# Patient Record
Sex: FEMALE | Race: WHITE | Employment: OTHER | URBAN - METROPOLITAN AREA
[De-identification: names, ages, dates, MRNs, and addresses within clinical notes are randomized per-mention and may not be internally consistent; named-entity substitution may affect disease eponyms.]

---

## 2017-06-08 PROBLEM — Z83.719 FAMILY HISTORY OF COLONIC POLYPS: Status: ACTIVE | Noted: 2017-06-08

## 2017-06-08 PROBLEM — Z83.71 FAMILY HISTORY OF COLONIC POLYPS: Status: ACTIVE | Noted: 2017-06-08

## 2018-08-11 PROCEDURE — 86376 MICROSOMAL ANTIBODY EACH: CPT | Performed by: INTERNAL MEDICINE

## 2018-08-11 PROCEDURE — 86800 THYROGLOBULIN ANTIBODY: CPT | Performed by: INTERNAL MEDICINE

## 2018-08-11 PROCEDURE — 83516 IMMUNOASSAY NONANTIBODY: CPT | Performed by: INTERNAL MEDICINE

## 2018-11-13 PROCEDURE — 86235 NUCLEAR ANTIGEN ANTIBODY: CPT | Performed by: INTERNAL MEDICINE

## 2018-11-13 PROCEDURE — 36415 COLL VENOUS BLD VENIPUNCTURE: CPT | Performed by: INTERNAL MEDICINE

## 2019-03-19 PROBLEM — M16.0 OSTEOARTHRITIS OF BOTH HIPS: Status: ACTIVE | Noted: 2019-03-19

## 2019-03-19 PROBLEM — Z83.719 FAMILY HISTORY OF COLONIC POLYPS: Status: RESOLVED | Noted: 2017-06-08 | Resolved: 2019-03-19

## 2019-03-19 PROBLEM — J38.00 PARALYSIS OF VOCAL CORDS: Status: ACTIVE | Noted: 2019-03-19

## 2019-03-19 PROBLEM — Z13.228 ENCOUNTER FOR SCREENING FOR METABOLIC DISORDER: Status: ACTIVE | Noted: 2019-03-19

## 2019-03-19 PROBLEM — Z13.220 ENCOUNTER FOR SCREENING FOR LIPID DISORDER: Status: ACTIVE | Noted: 2019-03-19

## 2019-03-19 PROBLEM — Z83.71 FAMILY HISTORY OF COLONIC POLYPS: Status: RESOLVED | Noted: 2017-06-08 | Resolved: 2019-03-19

## 2019-03-19 PROBLEM — Z00.00 MEDICARE ANNUAL WELLNESS VISIT, SUBSEQUENT: Status: ACTIVE | Noted: 2019-03-19

## 2020-01-07 PROBLEM — M25.572 ACUTE LEFT ANKLE PAIN: Status: ACTIVE | Noted: 2020-01-07

## 2020-02-17 PROBLEM — I87.2 CHRONIC VENOUS INSUFFICIENCY: Status: ACTIVE | Noted: 2020-02-17

## 2020-02-17 PROBLEM — N30.00 ACUTE CYSTITIS WITHOUT HEMATURIA: Status: ACTIVE | Noted: 2020-02-17

## 2020-05-20 PROBLEM — Z20.822 EXPOSURE TO COVID-19 VIRUS: Status: ACTIVE | Noted: 2020-05-20

## 2020-11-20 PROBLEM — H91.90 DECREASED HEARING, UNSPECIFIED LATERALITY: Status: ACTIVE | Noted: 2020-11-20

## 2021-04-12 DIAGNOSIS — Z23 NEED FOR VACCINATION: ICD-10-CM

## 2022-04-18 NOTE — LETTER
[facility]  [address], [place], [place]    Authorization for Surgical Operation and Procedure                               I hereby authorize Jordy Kasper MD, my physician and his/her assistants (if applicable), which may include medical students, residents, and/or fellows, to perform the following surgical operation/ procedure and administer such anesthesia as may be determined necessary by my physician: Operation/Procedure name (s) Right breast Mastectomy, with lymphoscintigraphy sentinel lymph node biopsy on Bessy DOMINGA Melchor   2.   I recognize that during the surgical operation/procedure, unforeseen conditions may necessitate additional or different procedures than those listed above.  I, therefore, further authorize and request that the above-named surgeon, assistants, or designees perform such procedures as are, in their judgment, necessary and desirable.    3.   My surgeon/physician has discussed prior to my surgery the potential benefits, risks and side effects of this procedure; the likelihood of achieving goals; and potential problems that might occur during recuperation.  They also discussed reasonable alternatives to the procedure, including risks, benefits, and side effects related to the alternatives and risks related to not receiving this procedure.  I have had all my questions answered and I acknowledge that no guarantee has been made as to the result that may be obtained.    4.   Should the need arise during my operation/procedure, which includes change of level of care prior to discharge, I also consent to the administration of blood and/or blood products.  Further, I understand that despite careful testing and screening of blood or blood products by collecting agencies, I may still be subject to ill effects as a result of receiving a blood transfusion and/or blood products.  The following are some, but not all, of the potential risks that can occur: fever and allergic  reactions, hemolytic reactions, transmission of diseases such as Hepatitis, AIDS and Cytomegalovirus (CMV) and fluid overload.  In the event that I wish to have an autologous transfusion of my own blood, or a directed donor transfusion, I will discuss this with my physician.  Check only if Refusing Blood or Blood Products  I understand refusal of blood or blood products as deemed necessary by my physician may have serious consequences to my condition to include possible death. I hereby assume responsibility for my refusal and release the hospital, its personnel, and my physicians from any responsibility for the consequences of my refusal.    o  Refuse   5.   I authorize the use of any specimen, organs, tissues, body parts or foreign objects that may be removed from my body during the operation/procedure for diagnosis, research or teaching purposes and their subsequent disposal by hospital authorities.  I also authorize the release of specimen test results and/or written reports to my treating physician on the hospital medical staff or other referring or consulting physicians involved in my care, at the discretion of the Pathologist or my treating physician.    6.   I consent to the photographing or videotaping of the operations or procedures to be performed, including appropriate portions of my body for medical, scientific, or educational purposes, provided my identity is not revealed by the pictures or by descriptive texts accompanying them.  If the procedure has been photographed/videotaped, the surgeon will obtain the original picture, image, videotape or CD.  The hospital will not be responsible for storage, release or maintenance of the picture, image, tape or CD.    7.   I consent to the presence of a  or observers in the operating room as deemed necessary by my physician or their designees.    8.   I recognize that in the event my procedure results in extended X-Ray/fluoroscopy time, I may  develop a skin reaction.    9. If I have a Do Not Attempt Resuscitation (DNAR) order in place, that status will be suspended while in the operating room, procedural suite, and during the recovery period unless otherwise explicitly stated by me (or a person authorized to consent on my behalf). The surgeon or my attending physician will determine when the applicable recovery period ends for purposes of reinstating the DNAR order.  10. Patients having a sterilization procedure: I understand that if the procedure is successful the results will be permanent and it will therefore be impossible for me to inseminate, conceive, or bear children.  I also understand that the procedure is intended to result in sterility, although the result has not been guaranteed.   11. I acknowledge that my physician has explained sedation/analgesia administration to me including the risk and benefits I consent to the administration of sedation/analgesia as may be necessary or desirable in the judgment of my physician.    I CERTIFY THAT I HAVE READ AND FULLY UNDERSTAND THE ABOVE CONSENT TO OPERATION and/or OTHER PROCEDURE.     ____________________________________  _________________________________        ______________________________  Signature of Patient    Signature of Responsible Person                Printed Name of Responsible Person                                      ____________________________________  _____________________________                ________________________________  Signature of Witness        Date  Time         Relationship to Patient    STATEMENT OF PHYSICIAN My signature below affirms that prior to the time of the procedure; I have explained to the patient and/or his/her legal representative, the risks and benefits involved in the proposed treatment and any reasonable alternative to the proposed treatment. I have also explained the risks and benefits involved in refusal of the proposed treatment and alternatives to  the proposed treatment and have answered the patient's questions. If I have a significant financial interest in a co-management agreement or a significant financial interest in any product or implant, or other significant relationship used in this procedure/surgery, I have disclosed this and had a discussion with my patient.     _____________________________________________________              _____________________________  (Signature of Physician)                                                                                         (Date)                                   (Time)  Patient Name: Bessy ORR Ruiz      : 1940      Printed: 2025     Medical Record #: [id number]                                      Page 1 of 1   impaired balance/decreased flexibility/impaired sensory feedback/decreased strength

## 2025-04-16 PROCEDURE — 88342 IMHCHEM/IMCYTCHM 1ST ANTB: CPT | Performed by: PATHOLOGY

## 2025-04-18 ENCOUNTER — LAB REQUISITION (OUTPATIENT)
Age: 85
End: 2025-04-18
Payer: MEDICARE

## 2025-04-18 DIAGNOSIS — D48.9 NEOPLASM OF UNCERTAIN BEHAVIOR: ICD-10-CM

## 2025-05-21 RX ORDER — CHOLECALCIFEROL (VITAMIN D3) 50 MCG
2000 TABLET ORAL DAILY
COMMUNITY

## 2025-05-23 ENCOUNTER — HOSPITAL ENCOUNTER (OUTPATIENT)
Dept: MAMMOGRAPHY | Facility: HOSPITAL | Age: 85
End: 2025-05-23
Attending: SURGERY
Payer: MEDICARE

## 2025-05-23 ENCOUNTER — HOSPITAL ENCOUNTER (OUTPATIENT)
Dept: NUCLEAR MEDICINE | Facility: HOSPITAL | Age: 85
Discharge: HOME | End: 2025-05-23
Attending: SURGERY
Payer: MEDICARE

## 2025-05-23 ENCOUNTER — HOSPITAL ENCOUNTER (OUTPATIENT)
Facility: HOSPITAL | Age: 85
Setting detail: HOSPITAL OUTPATIENT SURGERY
Discharge: HOME | End: 2025-05-23
Attending: SURGERY | Admitting: SURGERY
Payer: MEDICARE

## 2025-05-23 ENCOUNTER — ANESTHESIA EVENT (OUTPATIENT)
Dept: SURGERY | Facility: HOSPITAL | Age: 85
End: 2025-05-23
Payer: MEDICARE

## 2025-05-23 ENCOUNTER — ANESTHESIA (OUTPATIENT)
Dept: SURGERY | Facility: HOSPITAL | Age: 85
End: 2025-05-23
Payer: MEDICARE

## 2025-05-23 VITALS
HEART RATE: 63 BPM | SYSTOLIC BLOOD PRESSURE: 129 MMHG | BODY MASS INDEX: 33.37 KG/M2 | TEMPERATURE: 98 F | DIASTOLIC BLOOD PRESSURE: 65 MMHG | OXYGEN SATURATION: 98 % | HEIGHT: 62.5 IN | WEIGHT: 186 LBS | RESPIRATION RATE: 18 BRPM

## 2025-05-23 DIAGNOSIS — Z17.0 MALIGNANT NEOPLASM OF OVERLAPPING SITES OF RIGHT BREAST IN FEMALE, ESTROGEN RECEPTOR POSITIVE (HCC): Primary | ICD-10-CM

## 2025-05-23 DIAGNOSIS — C50.811 MALIGNANT NEOPLASM OF OVERLAPPING SITES OF RIGHT BREAST IN FEMALE, ESTROGEN RECEPTOR POSITIVE (HCC): Primary | ICD-10-CM

## 2025-05-23 DIAGNOSIS — C50.919 INVASIVE LOBULAR CARCINOMA OF BREAST IN FEMALE (HCC): ICD-10-CM

## 2025-05-23 PROCEDURE — 88307 TISSUE EXAM BY PATHOLOGIST: CPT | Performed by: SURGERY

## 2025-05-23 PROCEDURE — 88341 IMHCHEM/IMCYTCHM EA ADD ANTB: CPT | Performed by: SURGERY

## 2025-05-23 PROCEDURE — 88342 IMHCHEM/IMCYTCHM 1ST ANTB: CPT | Performed by: SURGERY

## 2025-05-23 PROCEDURE — 88309 TISSUE EXAM BY PATHOLOGIST: CPT | Performed by: SURGERY

## 2025-05-23 PROCEDURE — 78195 LYMPH SYSTEM IMAGING: CPT | Performed by: SURGERY

## 2025-05-23 RX ORDER — ACETAMINOPHEN 500 MG
1000 TABLET ORAL ONCE
Status: COMPLETED | OUTPATIENT
Start: 2025-05-23 | End: 2025-05-23

## 2025-05-23 RX ORDER — HYDROMORPHONE HYDROCHLORIDE 1 MG/ML
0.6 INJECTION, SOLUTION INTRAMUSCULAR; INTRAVENOUS; SUBCUTANEOUS EVERY 5 MIN PRN
Status: DISCONTINUED | OUTPATIENT
Start: 2025-05-23 | End: 2025-05-23

## 2025-05-23 RX ORDER — METOCLOPRAMIDE HYDROCHLORIDE 5 MG/ML
10 INJECTION INTRAMUSCULAR; INTRAVENOUS ONCE
Status: COMPLETED | OUTPATIENT
Start: 2025-05-23 | End: 2025-05-23

## 2025-05-23 RX ORDER — FAMOTIDINE 20 MG/1
20 TABLET, FILM COATED ORAL ONCE
Status: COMPLETED | OUTPATIENT
Start: 2025-05-23 | End: 2025-05-23

## 2025-05-23 RX ORDER — GLYCOPYRROLATE 0.2 MG/ML
INJECTION, SOLUTION INTRAMUSCULAR; INTRAVENOUS AS NEEDED
Status: DISCONTINUED | OUTPATIENT
Start: 2025-05-23 | End: 2025-05-23 | Stop reason: SURG

## 2025-05-23 RX ORDER — METOPROLOL TARTRATE 25 MG/1
25 TABLET, FILM COATED ORAL ONCE AS NEEDED
Status: DISCONTINUED | OUTPATIENT
Start: 2025-05-23 | End: 2025-05-23 | Stop reason: HOSPADM

## 2025-05-23 RX ORDER — MINERAL OIL AND PETROLATUM 150; 830 MG/G; MG/G
OINTMENT OPHTHALMIC AS NEEDED
Status: DISCONTINUED | OUTPATIENT
Start: 2025-05-23 | End: 2025-05-23 | Stop reason: SURG

## 2025-05-23 RX ORDER — SODIUM CHLORIDE, SODIUM LACTATE, POTASSIUM CHLORIDE, CALCIUM CHLORIDE 600; 310; 30; 20 MG/100ML; MG/100ML; MG/100ML; MG/100ML
INJECTION, SOLUTION INTRAVENOUS CONTINUOUS
Status: DISCONTINUED | OUTPATIENT
Start: 2025-05-23 | End: 2025-05-23

## 2025-05-23 RX ORDER — TRAMADOL HYDROCHLORIDE 50 MG/1
50 TABLET ORAL EVERY 6 HOURS PRN
Qty: 20 TABLET | Refills: 0 | Status: SHIPPED | OUTPATIENT
Start: 2025-05-23

## 2025-05-23 RX ORDER — DEXAMETHASONE SODIUM PHOSPHATE 4 MG/ML
VIAL (ML) INJECTION AS NEEDED
Status: DISCONTINUED | OUTPATIENT
Start: 2025-05-23 | End: 2025-05-23 | Stop reason: SURG

## 2025-05-23 RX ORDER — EPHEDRINE SULFATE 50 MG/ML
INJECTION INTRAVENOUS AS NEEDED
Status: DISCONTINUED | OUTPATIENT
Start: 2025-05-23 | End: 2025-05-23 | Stop reason: SURG

## 2025-05-23 RX ORDER — ISOSULFAN BLUE 50 MG/5ML
INJECTION, SOLUTION SUBCUTANEOUS AS NEEDED
Status: DISCONTINUED | OUTPATIENT
Start: 2025-05-23 | End: 2025-05-23 | Stop reason: HOSPADM

## 2025-05-23 RX ORDER — KETOROLAC TROMETHAMINE 30 MG/ML
INJECTION, SOLUTION INTRAMUSCULAR; INTRAVENOUS AS NEEDED
Status: DISCONTINUED | OUTPATIENT
Start: 2025-05-23 | End: 2025-05-23 | Stop reason: SURG

## 2025-05-23 RX ORDER — LIDOCAINE HYDROCHLORIDE 10 MG/ML
INJECTION, SOLUTION EPIDURAL; INFILTRATION; INTRACAUDAL; PERINEURAL AS NEEDED
Status: DISCONTINUED | OUTPATIENT
Start: 2025-05-23 | End: 2025-05-23 | Stop reason: SURG

## 2025-05-23 RX ORDER — MORPHINE SULFATE 4 MG/ML
2 INJECTION, SOLUTION INTRAMUSCULAR; INTRAVENOUS EVERY 10 MIN PRN
Status: DISCONTINUED | OUTPATIENT
Start: 2025-05-23 | End: 2025-05-23

## 2025-05-23 RX ORDER — BUPIVACAINE HYDROCHLORIDE 2.5 MG/ML
INJECTION, SOLUTION EPIDURAL; INFILTRATION; INTRACAUDAL; PERINEURAL AS NEEDED
Status: DISCONTINUED | OUTPATIENT
Start: 2025-05-23 | End: 2025-05-23 | Stop reason: HOSPADM

## 2025-05-23 RX ORDER — MORPHINE SULFATE 4 MG/ML
4 INJECTION, SOLUTION INTRAMUSCULAR; INTRAVENOUS EVERY 10 MIN PRN
Status: DISCONTINUED | OUTPATIENT
Start: 2025-05-23 | End: 2025-05-23

## 2025-05-23 RX ORDER — MORPHINE SULFATE 10 MG/ML
6 INJECTION, SOLUTION INTRAMUSCULAR; INTRAVENOUS EVERY 10 MIN PRN
Status: DISCONTINUED | OUTPATIENT
Start: 2025-05-23 | End: 2025-05-23

## 2025-05-23 RX ORDER — HYDROMORPHONE HYDROCHLORIDE 1 MG/ML
0.2 INJECTION, SOLUTION INTRAMUSCULAR; INTRAVENOUS; SUBCUTANEOUS EVERY 5 MIN PRN
Status: DISCONTINUED | OUTPATIENT
Start: 2025-05-23 | End: 2025-05-23

## 2025-05-23 RX ORDER — ONDANSETRON 2 MG/ML
INJECTION INTRAMUSCULAR; INTRAVENOUS AS NEEDED
Status: DISCONTINUED | OUTPATIENT
Start: 2025-05-23 | End: 2025-05-23 | Stop reason: SURG

## 2025-05-23 RX ORDER — HYDROMORPHONE HYDROCHLORIDE 1 MG/ML
0.4 INJECTION, SOLUTION INTRAMUSCULAR; INTRAVENOUS; SUBCUTANEOUS EVERY 5 MIN PRN
Status: DISCONTINUED | OUTPATIENT
Start: 2025-05-23 | End: 2025-05-23

## 2025-05-23 RX ORDER — NALOXONE HYDROCHLORIDE 0.4 MG/ML
0.08 INJECTION, SOLUTION INTRAMUSCULAR; INTRAVENOUS; SUBCUTANEOUS AS NEEDED
Status: DISCONTINUED | OUTPATIENT
Start: 2025-05-23 | End: 2025-05-23

## 2025-05-23 RX ORDER — FAMOTIDINE 10 MG/ML
20 INJECTION, SOLUTION INTRAVENOUS ONCE
Status: COMPLETED | OUTPATIENT
Start: 2025-05-23 | End: 2025-05-23

## 2025-05-23 RX ORDER — METOCLOPRAMIDE 10 MG/1
10 TABLET ORAL ONCE
Status: COMPLETED | OUTPATIENT
Start: 2025-05-23 | End: 2025-05-23

## 2025-05-23 RX ADMIN — GLYCOPYRROLATE 0.1 MG: 0.2 INJECTION, SOLUTION INTRAMUSCULAR; INTRAVENOUS at 14:46:00

## 2025-05-23 RX ADMIN — LIDOCAINE HYDROCHLORIDE 50 MG: 10 INJECTION, SOLUTION EPIDURAL; INFILTRATION; INTRACAUDAL; PERINEURAL at 14:08:00

## 2025-05-23 RX ADMIN — MINERAL OIL AND PETROLATUM 1 APPLICATION: 150; 830 OINTMENT OPHTHALMIC at 14:11:00

## 2025-05-23 RX ADMIN — EPHEDRINE SULFATE 5 MG: 50 INJECTION INTRAVENOUS at 15:35:00

## 2025-05-23 RX ADMIN — GLYCOPYRROLATE 0.1 MG: 0.2 INJECTION, SOLUTION INTRAMUSCULAR; INTRAVENOUS at 14:38:00

## 2025-05-23 RX ADMIN — SODIUM CHLORIDE, SODIUM LACTATE, POTASSIUM CHLORIDE, CALCIUM CHLORIDE: 600; 310; 30; 20 INJECTION, SOLUTION INTRAVENOUS at 16:24:00

## 2025-05-23 RX ADMIN — DEXAMETHASONE SODIUM PHOSPHATE 4 MG: 4 MG/ML VIAL (ML) INJECTION at 14:11:00

## 2025-05-23 RX ADMIN — KETOROLAC TROMETHAMINE 15 MG: 30 INJECTION, SOLUTION INTRAMUSCULAR; INTRAVENOUS at 15:37:00

## 2025-05-23 RX ADMIN — ONDANSETRON 4 MG: 2 INJECTION INTRAMUSCULAR; INTRAVENOUS at 15:37:00

## 2025-05-23 RX ADMIN — SODIUM CHLORIDE, SODIUM LACTATE, POTASSIUM CHLORIDE, CALCIUM CHLORIDE: 600; 310; 30; 20 INJECTION, SOLUTION INTRAVENOUS at 14:04:00

## 2025-05-23 RX ADMIN — SODIUM CHLORIDE, SODIUM LACTATE, POTASSIUM CHLORIDE, CALCIUM CHLORIDE: 600; 310; 30; 20 INJECTION, SOLUTION INTRAVENOUS at 15:26:00

## 2025-05-23 NOTE — DISCHARGE INSTRUCTIONS
HOME INSTRUCTIONS    Leave Ace wrap in place for 5 days.    Take Tylenol for pain as needed.    Take Tramadol for breakthrough pain    Keep right upper extremity activity below level with the horizon    Sponge bathe only, until the drain is removed.    Dr. Kasper will call with the pathology report next week.    AMBSURG HOME CARE INSTRUCTIONS: POST-OP ANESTHESIA  The medication that you received for sedation or general anesthesia can last up to 24 hours. Your judgment and reflexes may be altered, even if you feel like your normal self.      We Recommend:   Do not drive any motor vehicle or bicycle   Avoid mowing the lawn, playing sports, or working with power tools/applicances (power saws, electric knives or mixers)   That you have someone stay with you on your first night home   Do not drink alcohol or take sleeping pills or tranquilizers   Do not sign legal documents within 24 hours of your procedure   If you had a nerve block for your surgery, take extra care not to put any pressure on your arm or hand for 24 hours    It is normal:  For you to have a sore throat if you had a breathing tube during surgery (while you were asleep!). The sore throat should get better within 48 hours. You can gargle with warm salt water (1/2 tsp in 4 oz warm water) or use a throat lozenge for comfort  To feel muscle aches or soreness especially in the abdomen, chest or neck. The achy feeling should go away in the next 24 hours  To feel weak, sleepy or \"wiped out\". Your should start feeling better in the next 24 hours.   To experience mild discomforts such as sore lip or tongue, headache, cramps, gas pains or a bloated feeling in your abdomen.   To experience mild back pain or soreness for a day or two if you had spinal or epidural anesthesia.   If you had laparoscopic surgery, to feel shoulder pain or discomfort on the day of surgery.   For some patients to have nausea after surgery/anesthesia    If you feel nausea or experience  vomiting:   Try to move around less.   Eat less than usual or drink only liquids until the next morning   Nausea should resolve in about 24 hours    If you have a problem when you are at home:    Call your surgeons office     Discharge Instructions: After Your Surgery  You’ve just had surgery. During surgery, you were given medicine called anesthesia to keep you relaxed and free of pain. After surgery, you may have some pain or nausea. This is common. Here are some tips for feeling better and getting well after surgery.   Going home  Your healthcare provider will show you how to take care of yourself when you go home. They'll also answer your questions. Have an adult family member or friend drive you home. For the first 24 hours after your surgery:   Don't drive or use heavy equipment.  Don't make important decisions or sign legal papers.  Take medicines as directed.  Don't drink alcohol.  Have someone stay with you, if needed. They can watch for problems and help keep you safe.  Be sure to go to all follow-up visits with your healthcare provider. And rest after your surgery for as long as your provider tells you to.   Coping with pain  If you have pain after surgery, pain medicine will help you feel better. Take it as directed, before pain becomes severe. Also, ask your healthcare provider or pharmacist about other ways to control pain. This might be with heat, ice, or relaxation. And follow any other instructions your surgeon or nurse gives you.      Stay on schedule with your medicine.     Tips for taking pain medicine  To get the best relief possible, remember these points:   Pain medicines can upset your stomach. Taking them with a little food may help.  Most pain relievers taken by mouth need at least 20 to 30 minutes to start to work.  Don't wait till your pain becomes severe before you take your medicine. Try to time your medicine so that you can take it before starting an activity. This might be before you  get dressed, go for a walk, or sit down for dinner.  Constipation is a common side effect of some pain medicines. Call your healthcare provider before taking any medicines, such as laxatives or stool softeners, to help ease constipation. Also ask if you should skip any foods. Drinking lots of fluids and eating foods, such as fruits and vegetables, that are high in fiber can also help. Remember, don't take laxatives unless your surgeon has prescribed them.  Drinking alcohol and taking pain medicine can cause dizziness and slow your breathing. It can even be deadly. Don't drink alcohol while taking pain medicine.  Pain medicine can make you react more slowly to things. Don't drive or run machinery while taking pain medicine.  Your healthcare provider may tell you to take acetaminophen to help ease your pain. Ask them how much you're supposed to take each day. Acetaminophen or other pain relievers may interact with your prescription medicines or other over-the-counter (OTC) medicines. Some prescription medicines have acetaminophen and other ingredients in them. Using both prescription and OTC acetaminophen for pain can cause you to accidentally overdose. Read the labels on your OTC medicines with care. This will help you to clearly know the list of ingredients, how much to take, and any warnings. It may also help you not take too much acetaminophen. If you have questions or don't understand the information, ask your pharmacist or healthcare provider to explain it to you before you take the OTC medicine.   Managing nausea  Some people have an upset stomach (nausea) after surgery. This is often because of anesthesia, pain, or pain medicine, less movement of food in the stomach, or the stress of surgery. These tips will help you handle nausea and eat healthy foods as you get better. If you were on a special food plan before surgery, ask your healthcare provider if you should follow it while you get better. Check with your  provider on how your eating should progress. It may depend on the surgery you had. These general tips may help:   Don't push yourself to eat. Your body will tell you when to eat and how much.  Start off with clear liquids and soup. They're easier to digest.  Next try semi-solid foods as you feel ready. These include mashed potatoes, applesauce, and gelatin.  Slowly move to solid foods. Don’t eat fatty, rich, or spicy foods at first.  Don't force yourself to have 3 large meals a day. Instead eat smaller amounts more often.  Take pain medicines with a small amount of solid food, such as crackers or toast. This helps prevent nausea.  When to call your healthcare provider  Call your healthcare provider right away if you have any of these:   You still have too much pain, or the pain gets worse, after taking the medicine. The medicine may not be strong enough. Or there may be a complication from the surgery.  You feel too sleepy, dizzy, or groggy. The medicine may be too strong.  Side effects, such as nausea or vomiting. Your healthcare provider may advise taking other medicines to treat these or may change your treatment plan..  Skin changes, such as rash, itching, or hives. This may mean you have an allergic reaction. Your provider may advise taking other medicines.  The incision looks different (for instance, part of it opens up).  Bleeding or fluid leaking from the incision site, and you weren't told to expect that.  Fever of 100.4°F (38°C) or higher, or as directed by your healthcare provider.  Call 911  Call 911 right away if you have:   Trouble breathing  Facial swelling    If you have obstructive sleep apnea   You were given anesthesia medicine during surgery to keep you comfortable and free of pain. After surgery, you may have more apnea spells because of this medicine and other medicines you were given. The spells may last longer than normal.    At home:  Keep using the continuous positive airway pressure (CPAP)  device when you sleep. Unless your healthcare provider tells you not to, use it when you sleep, day or night. CPAP is a common device used to treat obstructive sleep apnea.  Talk with your provider before taking any pain medicine, muscle relaxants, or sedatives. Your provider will tell you about the possible dangers of taking these medicines.  Contact your provider if your sleeping changes a lot even when taking medicines as directed.  [employer] last reviewed this educational content on 4/1/2024  This information is for informational purposes only. This is not intended to be a substitute for professional medical advice, diagnosis, or treatment. Always seek the advice and follow the directions from your physician or other qualified health care provider.  © 6868-4742 [employer]. All rights reserved. This information is not intended as a substitute for professional medical care. Always follow your healthcare professional's instructions.

## 2025-05-23 NOTE — INTERVAL H&P NOTE
Pre-op Diagnosis: Invasive lobular carcinoma of breast, stage 1, right    The above referenced H&P was reviewed by Jordy Kasper MD on 5/23/2025, the patient was examined and no significant changes have occurred in the patient's condition since the H&P was performed.  I discussed with the patient and/or legal representative the potential benefits, risks and side effects of this procedure; the likelihood of the patient achieving goals; and potential problems that might occur during recuperation.  I discussed reasonable alternatives to the procedure, including risks, benefits and side effects related to the alternatives and risks related to not receiving this procedure.  We will proceed with procedure as planned.    She had the core needle biopsy on the right, demonstrating invasive lobular carcinoma, grade 1, ER positive, KS negative, Ki-67 7%, HER2 negative.  There is lymphovascular invasion.  She saw Dr. Graham in hematology oncology consultation.  We initially discussed mastectomy, and MRI was deferred.  She then reconsidered lumpectomy, and the MRI was completed, which demonstrated just 5.3 cm of suspicious tissue at the index lesion and a separate suspicious lesion in the lower outer quadrant of the breast, as well as permanent right axillary lymph nodes.  At that point, we settled on right total mastectomy with right axillary sentinel lymph node biopsy.  The risks, benefits, and alternatives were reviewed with the patient, her , and her daughter, including bleeding, infection, drain, numbness, postoperative pain, recovery, compression, activity, bathing, pathology, the difference between the sentinel lymph node biopsy procedure and the full axillary dissection she had with her left modified radical mastectomy in 1988, etc.  We are anticipating the probability of discharge today, but leaving open the option of staying the night in the hospital if need be.  The lymphoscintigraphy report and images were  reviewed and discussed with them.  I have also reviewed the MRI report and images.

## 2025-05-23 NOTE — OPERATIVE REPORT
[facility]  (part of [facility])         Patient Name: Bessy Melchor   MRN: [id number]     Date of Operation:  2025   Site:  [facility] (part of [facility])       : 1940       PREOPERATIVE DIAGNOSES: Right breast cancer, T1-3 N0     POSTOPERATIVE DIAGNOSES:  Same     PROCEDURE PERFORMED: Right total mastectomy, right axillary sentinel lymph node biopsy (deep), injection of isosulfan blue dye     SURGEON: Jordy Olivera M.D.      ASSISTANT: GABI Sanchez,  (skilled services required for positioning, prepping, draping, setting up the field, exposing, retracting, suturing, closing, dressing, etc.)     ANESTHESIA: General     COMPLICATIONS: None     ESTIMATED BLOOD LOSS: 50 mL     SPECIMEN: 3 right axillary sentinel lymph nodes, right breast     IMPLANTS: None     GRAFTS: None      DRAINS: 15 Vietnamese round drain     BLOOD PRODUCTS ADMINISTERED: None     HISTORY: This patient is a 84 year old-year-old female who has a history of left breast cancer in , having undergone a left modified radical mastectomy.  She recently had an abnormal right mammogram, and underwent a core needle biopsy, demonstrating invasive lobular carcinoma, grade 1, estrogen receptor positive, with lymphovascular invasion.  Extent-of-disease MRI demonstrated up to 5.3 cm of additional suspicious tissue at the index lesion, a separate suspicious lesion in the breast, and prominent right axillary lymph nodes.  We opted for right total mastectomy with sentinel lymph node biopsy.  The risks, benefits, and alternatives were discussed.      FINDINGS: 3 right axillary sentinel lymph nodes     PROCEDURE: The patient was seen in the preoperative holding area with her  and daughter. The surgical plan was reviewed.  The preoperative imaging was reviewed.  The lymphoscintigraphy images were reviewed and discussed with them.  The right breast was initialed. She was taken to the operating room,  placed in the supine position, and administered general anesthesia.  The upper extremities were positioned on armboards.  The field area was prepped and draped.  A timeout was performed.  5 mL of isosulfan blue dye was injected in the right periareolar area and a 5-minute massage was performed.  A skin and nipple sacrificing right mastectomy incision was marked.  Local anesthetic was infiltrated.  The lateral portion of the incision was opened at the axilla.  Using the aid of the gamma probe probe and visual inspection, dissection was carried through the clavipectoral fascia into the deep right axillary space.  The gamma probe led to a central axillary node which was markedly blue.  Dissected out, it had an ex vivo count of 1394.  It was forwarded to pathology labeled \"right axillary sentinel lymph node #1\".  The gamma probe led to a second deeper node.  Dissecting down to it, a palpable node was identified and removed.  It was not blue.  It had an ex vivo count of 41, and did not represent the hotspot identified on gamma scanning.  It was forwarded to pathology labeled \"right axillary sentinel lymph node #2\".  The gamma probe scanning reidentified the deeper hotspot, and the third lymph node was removed, having an ex vivo count of 1857.  It was not blue.  It was forwarded to pathology labeled \"right axillary sentinel lymph node #3\".  There were no additional hotspots, blue nodes, untraced blue lymphatics, or palpable nodes in the axilla.  The background count was 7.  The remainder of the mastectomy incision was opened with scalpel.  Flaps were raised superiorly, medially, and inferior to the extent of the breast.  The breast was removed en bloc with the pectoral fascia.  The dissection was carried off the lateral pectoralis major and minor muscles and incorporated the entire axillary tail of the breast from the low axilla.  At no point during the dissection was any suspicious tissue identified under the flaps or  along the chest wall.  The specimen was oriented with a marking stitch on the lateral end of the skin ellipse and forwarded to pathology labeled \"right breast\".  The wound was irrigated with water and rendered hemostatic.  The skin was contoured for flat closure.  A 15 Malaysian drain was introduced through the lateral lower flap and positioned through the axilla and under the upper flap.  It was secured with a silk stitch, and subsequently dressed with a Biopatch and Tegaderm.  The skin was reapproximated with INSORB staples followed by a Vicryl subcuticular stitch.  Dermabond, Steri-Strips, fluffs, ABDs, and a 6 inch Ace wrap were applied.  The drain was placed to bulb suction.  The needle, sponge, and instrument counts were reported as correct.  The patient was awakened and transported to recovery.  Her family was notified of the findings and her status.        [employer] Node Biopsy for Breast Cancer - Left  Operation performed with curative intent. Yes   Tracer(s) used to identify sentinel nodes in the upfront surgery (non-neoadjuvant) setting (select all that apply). Dye and Radioactive tracer   Tracer(s) used to identify sentinel nodes in the neoadjuvant setting (select all that apply). N/A   All nodes (colored or non-colored) present at the end of a dye-filled lymphatic channel were removed. Yes   All significantly radioactive nodes were removed. Yes   All palpably suspicious nodes were removed. Yes   Biopsy-proven positive nodes marked with clips prior to chemotherapy were identified and removed. N/A         Jordy Olivera MD

## 2025-05-23 NOTE — ANESTHESIA PREPROCEDURE EVALUATION
Anesthesia PreOp Note    HPI:     Bessy Melchor is a 84 year old female who presents for preoperative consultation requested by: Jordy Kasper MD    Date of Surgery: 5/23/2025    Procedure(s):  Right breast Mastectomy, with lymphoscintigraphy sentinel lymph node biopsy  Breast sentinel lymph node biopsy  Indication: Invasive lobular carcinoma of breast, stage 1, right    Relevant Problems   No relevant active problems       NPO:  Last Liquid Consumption Date: 05/23/25  Last Liquid Consumption Time: 0800 (SMART WATER 1 BOTTLE)  Last Solid Consumption Date: 05/22/25  Last Solid Consumption Time: 2100  Last Liquid Consumption Date: 05/23/25          History Review:  Patient Active Problem List    Diagnosis Date Noted    Decreased hearing, unspecified laterality 11/20/2020    Exposure to COVID-19 virus 05/20/2020    Chronic venous insufficiency 02/17/2020    Acute cystitis without hematuria 02/17/2020    Acute left ankle pain 01/07/2020    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             Vital Signs:  Body mass index is 33.48 kg/m².   height is 1.588 m (5' 2.5\") and weight is 84.4 kg (186 lb). Her oral temperature is 97.5 °F (36.4 °C). Her blood pressure is 142/64 and her pulse is 59. Her respiration is 18 and oxygen saturation is 98%.   Vitals:    05/21/25 0832 05/23/25 1101   BP:  142/64   Pulse:  59   Resp:  18   Temp:  97.5 °F (36.4 °C)   TempSrc:  Oral   SpO2:  98%    Weight: 81.6 kg (180 lb) 84.4 kg (186 lb)   Height: 1.588 m (5' 2.5\")         Anesthesia Evaluation     Patient summary reviewed and Nursing notes reviewed    No history of anesthetic complications   Airway   Mallampati: I  TM distance: >3 FB  Neck ROM: full  Dental      Pulmonary - negative ROS and normal exam   Cardiovascular - normal exam  (+) hypertension    Neuro/Psych - negative ROS     GI/Hepatic/Renal    (+) GERD    Endo/Other    Abdominal                  Anesthesia Plan:   ASA:  3  Plan:   General  Informed Consent Plan and Risks Discussed With:  Patient      I have informed Bessy Sosapaulsusanna and/or legal guardian or family member of the nature of the anesthetic plan, benefits, risks including possible dental damage if relevant, major complications, and any alternative forms of anesthetic management.   All of the patient's questions were answered to the best of my ability. The patient desires the anesthetic management as planned.  Rosa Sanchez MD  5/23/2025 11:54 AM  Present on Admission:  **None**           [1]   Past Medical History:   Breast cancer (HCC)    Cataract    Colon polyps    Degenerative disc disease, cervical    Esophageal reflux    High blood pressure    High cholesterol    Incontinence    Other and unspecified hyperlipidemia    Unspecified essential hypertension    Visual impairment    glasses   [2]   Past Surgical History:  Procedure Laterality Date    Cataract      Cholecystectomy      Hysterectomy      Other surgical history      L. Radical Mastectomy     Other surgical history      biopsy non-cancerous papilloma - R/ breast    [3]   Medications Prior to Admission   Medication Sig Dispense Refill Last Dose/Taking    Cholecalciferol 50 MCG (2000 UT) Oral Tab Take 1 tablet (2,000 Units total) by mouth daily.   5/22/2025 Morning    B Complex Oral Cap Take 1 capsule by mouth.   5/22/2025 Morning    Vitamin A 2400 MCG (8000 UT) Oral Cap Take by mouth.   5/22/2025 Morning    ANALPRAM HC  2.5-1 % Rectal Cream Place 1 Application  rectally as needed.   Taking As Needed    Rosuvastatin Calcium 5 MG Oral Tab   1 5/21/2025    Losartan Potassium (COZAAR) 50 MG Oral Tab Take 1 tablet by mouth once daily. 90 tablet 3 5/22/2025 Morning    atenolol (TENORMIN) 25 MG Oral Tab 1 TABLET DAILY, 0.5 TABLET IN PM (Patient taking differently: Take 1 tablet (25 mg total) by mouth in the morning and 1 tablet (25 mg total) before bedtime. 1 TABLET DAILY, 0.5 TABLET IN PM.) 135 tablet 3 5/23/2025 at  8:00 AM    Multiple Vitamins-Minerals (CENTRUM SILVER OR) Take by mouth.   5/22/2025 Morning    Calcium Citrate-Vitamin D (CITRACAL + D OR) Take by mouth.   Past Week    Probiotic Product (PROBIOTIC OR) Take by mouth.   5/22/2025 Morning    acetaminophen 500 MG Oral Tab Take 1 tablet (500 mg total) by mouth every 6 (six) hours as needed for Pain.   More than a month   [4]   Current Facility-Administered Medications Ordered in Epic   Medication Dose Route Frequency Provider Last Rate Last Admin    lactated ringers infusion   Intravenous Continuous Jordy Kasper MD 20 mL/hr at 05/23/25 1125 New Bag at 05/23/25 1125    metoprolol tartrate (Lopressor) tab 25 mg  25 mg Oral Once PRN Jordy Kasper MD        ceFAZolin (Ancef) 2g in 10mL IV syringe premix  2 g Intravenous Once Jordy Kasper MD         No current [facility]-ordered outpatient medications on file.   [5]   Allergies  Allergen Reactions    Albumin, Egg HIVES    Egg White SWELLING     Swelling of tongue     Penicillins HIVES and RASH    Sulfa Antibiotics HIVES    Epinephrine JITTERY, ANXIETY and RASH     tachycardia    Azithromycin UNKNOWN    Egg UNKNOWN    Adhesive Tape ITCHING    Wound Dressing Adhesive ITCHING   [6]   Family History  Problem Relation Age of Onset    Cancer Father         colon    Other (Other) Father         cva    Cancer Mother         colon    Heart Disorder Mother    [7]   Social History  Socioeconomic History    Marital status:    Tobacco Use     Smoking status: Never    Smokeless tobacco: Never   Vaping Use    Vaping status: Never Used   Substance and Sexual Activity    Alcohol use: No     Comment: rarely    Drug use: No    Sexual activity: Yes     Partners: Male

## 2025-05-23 NOTE — ANESTHESIA PROCEDURE NOTES
Airway  Date/Time: 5/23/2025 2:08 PM  Reason: Elective    Airway not difficult    General Information and Staff   Patient location during procedure: OR  Resident/CRNA: Marisa Johnson CRNA  Performed: CRNA   Performed by: Marisa Johnson CRNA  Authorized by: Rosa Sanchez MD        Indications and Patient Condition  Indications for airway management: anesthesia  Sedation level: deep      Preoxygenated: yesPatient position: sniffing  MILS maintained throughout    Mask difficulty assessment: 1 - vent by mask  Planned trial extubation    Final Airway Details    Final airway type: supraglottic airway      Successful airway: classic  Size: 3     Number of attempts at approach: 1  Number of other approaches attempted: 0    Additional Comments  LMA #3 placed easily- atraumatic

## 2025-05-23 NOTE — ANESTHESIA POSTPROCEDURE EVALUATION
Patient: Bessy Melchor    Procedure Summary       Date: 05/23/25 Room / Location: [facility] MAIN OR 02 / [facility] MAIN OR    Anesthesia Start: 1359 Anesthesia Stop: 1624    Procedures:       Right breast Mastectomy, with lymphoscintigraphy sentinel lymph node biopsy (Right: Breast)      Breast sentinel lymph node biopsy (Right: Breast) Diagnosis: (Invasive lobular carcinoma of breast, stage 1, right)    Surgeons: Jordy Kasper MD Anesthesiologist: Rosa Sanchez MD    Anesthesia Type: general ASA Status: 3            Anesthesia Type: general    Vitals Value Taken Time   /61 05/23/25 16:23   Temp 97.5 °F (36.4 °C) 05/23/25 16:23   Pulse 65 05/23/25 16:24   Resp 16 05/23/25 16:23   SpO2 100 % 05/23/25 16:24   Vitals shown include unfiled device data.    [facility] AN Post Evaluation:   Patient Evaluated in PACU  Patient Participation: complete - patient participated  Level of Consciousness: awake  Pain Score: 0  Pain Management: adequate  Airway Patency:  Dental exam unchanged from preop  Yes    Nausea/Vomiting: none  Cardiovascular Status: acceptable  Respiratory Status: acceptable  Postoperative Hydration acceptable      Marisa Johnson CRNA  5/23/2025 4:24 PM

## 2025-05-23 NOTE — H&P
DIAGNOSIS: Hx of breast cancer (primary encounter diagnosis)  At high risk for breast cancer  Bi-rads category 3 mammogram result    STAGE: Unknown    INTERVAL SINCE DIAGNOSIS: 37 years    She had a left modified radical mastectomy by Dr. Soriano in 1988, at age 48.    DISEASE STATUS: BECKY     HISTORY:   This 84 year old woman returns to follow-up on a BI-RADS 3 recent screening right mammogram. She says she is doing well. She denies new breast symptoms. She denies lumps. She denies nipple discharge. She denies skin changes. She denies lymphadenopathy. Mood, weight, appetite and energy are stable.     She had the recent screening right mammogram, as below. She has several levels of concern about it. She was concerned about the radiologist mentioning she had a \"history of right breast cancer\", which is incorrect. He also mentioned \"the breasts (plural) are \"C\" density\". She is understandably concerned about this documentation being incorrect. We discussed transcription, templating, etc. She has had left breast cancer in the past, and had other experience with biopsies. The right side procedure was a benign intraductal papilloma.    She had a left modified radical mastectomy by Dr. Soriano in 1988. After he retired, she followed with Dr. Francois. After he retired, she follows Dr. Danielle. He retired, now she is seeing me.     She also had a right-sided intraductal papilloma excised in the past.    She is a retired teacher.    She is accompanied by her , Logan.    Her current medication(s) are:   Current Outpatient Medications   Medication Sig Dispense Refill   atenolol 25 MG Oral Tab 1 TABLET po bid 180 tablet 3   Probiotic Product (ALIGN OR) Take by mouth.   B Complex Oral Cap Take 1 capsule by mouth.   Calcium Citrate (CITRACAL OR) Citracal   Vitamin A 2400 MCG (8000 UT) Oral Cap Take by mouth.   acetaminophen 500 MG Oral Tab Take 500 mg by mouth every 6 (six) hours as needed for Pain.   Rosuvastatin Calcium 5  MG Oral Tab TK 1 T PO 3 TIMES A WK 1   Losartan Potassium (COZAAR) 50 MG Oral Tab Take 1 tablet by mouth once daily. 90 tablet 3   Multiple Vitamins-Minerals (CENTRUM SILVER OR) None Entered   Calcium Citrate-Vitamin D (CITRACAL + D OR) 2 tabs 2 times daily     PHYSICAL EXAMINATION:   Arm Circumference: normal. I am not able to discern any lymphedema of her left upper extremity. The skin over the dorsum of her left hand is thin, with good visibility of the veins. She has her wedding band on her left hand, and there does not seem to be any edema around it.    This is a well-nourished, alert and oriented woman. There is palpable cervical, supraclavicular or axillary adenopathy.   The left breast is surgically absent. There are no subflap masses or skin lesions. The chest wall shows no evidence of local recurrence. There is no reconstruction. The right nipple area is everted without discharge. The right breast is large and ptotic. There are no dominant masses, focal nodularity or skin changes on the right side. The abdomen is soft, flat and nontender, without palpable masses or organomegaly.    IMAGING:   EXAM: BI MAMMOGRAM DIAGNOSTIC W TOMOSYNTHESIS RIGHT, BI US BREAST LIMITED RIGHT   ACCESSION: [id number], [id number]     INDICATION: History of left mastectomy. History of right breast cancer.     TECHNIQUE: Low dose full field digital breast tomosynthesis examination was performed with 2D and 3D acquisitions. C-View 2D reconstructions were reviewed. The examination was read in conjunction with computed aided detection(CAD). Targeted ultrasound was performed.     COMPARISON: Previous dating back to 1/9/2017     BREAST COMPOSITION: C - The breasts are heterogeneously dense which may obscure small masses.     FINDINGS:   Posttreatment changes are noted within the upper outer aspect of the right breast at middle depth. There is a new focal asymmetry within the right subareolar region. In addition, there are  prominent appearing right axillary lymph nodes.     Targeted ultrasound of the right breast was performed.     No focal sonographic abnormalities identified in the retroareolar region.     Postsurgical changes are seen in the upper outer right breast.     Scanning of the right axilla demonstrates prominent, probably benign axillary lymph nodes which measure up to 1.2 x 0.8 x 1.4 cm.     IMPRESSION: Probably benign right breast focal asymmetry in the subareolar region and probably benign right axillary lymph nodes. Recommend six-month follow-up right breast mammogram and right axillary ultrasound.     ASSESSMENT: 3: Probably benign.     RECOMMENDATION: 1. US 6 months Axilla: Right   2. Diagnostic Mammo 6 months Breast: Right     COMMENTS:     This examination was reviewed with the aid of a computer-aided detection (CAD) system.     This report uses terminology and categories recommended by the Breast Imaging Reporting and Data System (BI-RADS) of the American College of Radiology.     To comply with federal law, written results are being mailed to this patient.     This patient's information and the target due date for her next mammogram have been entered into a reminder system.     Created by: Victor Hugo Rocha MD   Signed by: Victor Hugo Rocha MD   Signed on: 3/13/2025 11:02 AM   Location: [place] [address]     Unable to review the images, as outside our system.    IMPRESSION: History of left breast cancer, status post left mastectomy 37 years ago, history of excision of right breast intraductal papilloma, now BI-RADS 3 screening right mammogram and ultrasound.    RECOMMENDATIONS: We had a lengthy discussion regarding BI-RADS 3, \"probably benign\". Like many patients, she is troubled by the choice of terminology. I explained this is seemingly an changeable terminology that is derived by the radiology protocols. We discussed that this has already been vetted through the breast radiologists' high requirement for  recommending a biopsy if there is anything concerning. However, as a breast cancer survivor, she is appreciably concerned. I recommended she obtain the images on CD for uploading, and I can review them with the radiologist. I suggested she obtain at least the images from 2024 as well as the current images. We discussed that the recommendation is for 6-month follow-up imaging, but moving forward with an image-guided needle biopsy is likely also an option. She may want to consider this.  With regard to the incorrect information in her mammogram report stating both breasts were imaged, and that she is a right breast cancer survivor, she will call radiology to have a correction placed. I will also reach out to them.

## 2025-05-23 NOTE — PROGRESS NOTES
I had a lengthy telephone conversation with Bessy yesterday.  The MRI demonstrated the biopsy-proven right breast cancer with extension by MRI criteria of 5.3 cm in the AP dimension.  There is a separate area of probable multicentric involvement in the lower outer quadrant by MRI.  There are prominent right axillary lymph nodes for which additional evaluation is recommended.  We agreed to proceed with right total mastectomy with right axillary sentinel lymph node biopsy under the circumstances.  She is not interested in having additional biopsies.  Original plan was for mastectomy, but this was subsequently switched to lumpectomy.  She has had a left modified radical mastectomy without reconstruction in the past.  We discussed the role of sentinel lymph node biopsy, particularly under the circumstances of the size of the index lesion, the lymphovascular invasion seen on the core needle biopsy, and the prominent lymph nodes seen on the MRI.  We had already discussed mastectomy in detail in the office, but reviewed the surgery, drain placement, possible overnight stay in the hospital, pain, nerve injury, seroma, bleeding, infection, etc.  The change in plan was communicated with the surgical team.

## (undated) DEVICE — YANKAUER,FLEXIBLE HANDLE,REGLR CAPACITY: Brand: MEDLINE INDUSTRIES, INC.

## (undated) DEVICE — AEGIS 1" DISK 4MM HOLE, PEEL OPEN: Brand: MEDLINE

## (undated) DEVICE — UNDYED BRAIDED (POLYGLACTIN 910), SYNTHETIC ABSORBABLE SUTURE: Brand: COATED VICRYL

## (undated) DEVICE — Device: Brand: JELCO

## (undated) DEVICE — SPONGE LAP 18X18IN WHT COT 4 PLY FLD STRUNG

## (undated) DEVICE — DRAPE,T,LAPARO,TRANS,STERILE: Brand: MEDLINE

## (undated) DEVICE — 3M™ STERI-STRIP™ REINFORCED ADHESIVE SKIN CLOSURES, R1548, 1 IN X 5 IN (25 MM X 125 MM), 4 STRIPS/ENVELOPE: Brand: 3M™ STERI-STRIP™

## (undated) DEVICE — STAPLER SKIN 30 ABSRB STPL RAP INSORB

## (undated) DEVICE — STANDARD HYPODERMIC NEEDLE,POLYPROPYLENE HUB: Brand: MONOJECT

## (undated) DEVICE — MINOR GENERAL: Brand: MEDLINE INDUSTRIES, INC.

## (undated) DEVICE — BLAKE SILICONE DRAIN, 15 FR ROUND, HUBLESS WITH 3/16" TROCAR: Brand: BLAKE

## (undated) DEVICE — SUT COAT VCRL 2-0 27IN ABSRB UD 26MM CT-2

## (undated) DEVICE — GAMMEX® PI HYBRID SIZE 7.5, STERILE POWDER-FREE SURGICAL GLOVE, POLYISOPRENE AND NEOPRENE BLEND: Brand: GAMMEX

## (undated) DEVICE — SOLUTION IRRIG 1000ML 0.9% NACL USP BTL

## (undated) DEVICE — BRA MASTECTOMY XL PNK ULT SFT PERF FAB STYL

## (undated) DEVICE — BNDG,ELSTC,MATRIX,STRL,6"X5YD,LF,HOOK&LP: Brand: MEDLINE

## (undated) DEVICE — SUT COAT VCRL + 2-0 18IN ABSRB UD ANTIBACT

## (undated) DEVICE — SUT PERMA- 0 18IN FSL NABSRB BLK L30MM 3/8

## (undated) DEVICE — INSULATED BLADE ELECTRODE: Brand: EDGE

## (undated) DEVICE — SAFEAIR TELESCOPIC SMOKE EVACUATION PENCIL, COATED, 70MM BLADE, PUSH BUTTON: Brand: STRYKER

## (undated) DEVICE — PAD,ABDOMINAL,8"X7.5",STERILE,LF,1/PK: Brand: MEDLINE

## (undated) DEVICE — DRAPE PACK CHEST AND U BAR

## (undated) DEVICE — SUT PERMA- 2-0 18IN FS NABSRB BLK 26MM 3/8

## (undated) DEVICE — ZZ-CONVERTED-TO-524825-ADHESIVE SKIN TOP FOR WND CLSR DERMBND ADV

## (undated) DEVICE — EVACUATOR SUR 100CC SIL BLB WND

## (undated) DEVICE — GAMMEX® PI HYBRID SIZE 8, STERILE POWDER-FREE SURGICAL GLOVE, POLYISOPRENE AND NEOPRENE BLEND: Brand: GAMMEX

## (undated) DEVICE — ZZ-DISC-SUB-405166-SUT COAT VCRL 3-0 27IN SH ABSRB UD 26MM 1/2

## (undated) DEVICE — WECK HORIZON MED BLUE CLIP DISP

## (undated) NOTE — LETTER
[address], [place], [place]    Authorization for Surgical Operation and Procedure                               I hereby authorize Jordy Kasper MD, my physician and his/her assistants (if applicable), which may include medical students, residents, and/or fellows, to perform the following surgical operation/ procedure and administer such anesthesia as may be determined necessary by my physician: Operation/Procedure name (s) Right breast lumpectomy with lymphoscintigraphy sentinel lymph node biopsy on Bessy Melchor   2.   I recognize that during the surgical operation/procedure, unforeseen conditions may necessitate additional or different procedures than those listed above.  I, therefore, further authorize and request that the above-named surgeon, assistants, or designees perform such procedures as are, in their judgment, necessary and desirable.    3.   My surgeon/physician has discussed prior to my surgery the potential benefits, risks and side effects of this procedure; the likelihood of achieving goals; and potential problems that might occur during recuperation.  They also discussed reasonable alternatives to the procedure, including risks, benefits, and side effects related to the alternatives and risks related to not receiving this procedure.  I have had all my questions answered and I acknowledge that no guarantee has been made as to the result that may be obtained.    4.   Should the need arise during my operation/procedure, which includes change of level of care prior to discharge, I also consent to the administration of blood and/or blood products.  Further, I understand that despite careful testing and screening of blood or blood products by collecting agencies, I may still be subject to ill effects as a result of receiving a blood transfusion and/or blood products.  The following are some, but not all, of the potential risks that can occur: fever and allergic  reactions, hemolytic reactions, transmission of diseases such as Hepatitis, AIDS and Cytomegalovirus (CMV) and fluid overload.  In the event that I wish to have an autologous transfusion of my own blood, or a directed donor transfusion, I will discuss this with my physician.  Check only if Refusing Blood or Blood Products  I understand refusal of blood or blood products as deemed necessary by my physician may have serious consequences to my condition to include possible death. I hereby assume responsibility for my refusal and release the hospital, its personnel, and my physicians from any responsibility for the consequences of my refusal.    o  Refuse   5.   I authorize the use of any specimen, organs, tissues, body parts or foreign objects that may be removed from my body during the operation/procedure for diagnosis, research or teaching purposes and their subsequent disposal by hospital authorities.  I also authorize the release of specimen test results and/or written reports to my treating physician on the hospital medical staff or other referring or consulting physicians involved in my care, at the discretion of the Pathologist or my treating physician.    6.   I consent to the photographing or videotaping of the operations or procedures to be performed, including appropriate portions of my body for medical, scientific, or educational purposes, provided my identity is not revealed by the pictures or by descriptive texts accompanying them.  If the procedure has been photographed/videotaped, the surgeon will obtain the original picture, image, videotape or CD.  The hospital will not be responsible for storage, release or maintenance of the picture, image, tape or CD.    7.   I consent to the presence of a  or observers in the operating room as deemed necessary by my physician or their designees.    8.   I recognize that in the event my procedure results in extended X-Ray/fluoroscopy time, I may  develop a skin reaction.    9. If I have a Do Not Attempt Resuscitation (DNAR) order in place, that status will be suspended while in the operating room, procedural suite, and during the recovery period unless otherwise explicitly stated by me (or a person authorized to consent on my behalf). The surgeon or my attending physician will determine when the applicable recovery period ends for purposes of reinstating the DNAR order.  10. Patients having a sterilization procedure: I understand that if the procedure is successful the results will be permanent and it will therefore be impossible for me to inseminate, conceive, or bear children.  I also understand that the procedure is intended to result in sterility, although the result has not been guaranteed.   11. I acknowledge that my physician has explained sedation/analgesia administration to me including the risk and benefits I consent to the administration of sedation/analgesia as may be necessary or desirable in the judgment of my physician.    I CERTIFY THAT I HAVE READ AND FULLY UNDERSTAND THE ABOVE CONSENT TO OPERATION and/or OTHER PROCEDURE.     ____________________________________  _________________________________        ______________________________  Signature of Patient    Signature of Responsible Person                Printed Name of Responsible Person                                      ____________________________________  _____________________________                ________________________________  Signature of Witness        Date  Time         Relationship to Patient    STATEMENT OF PHYSICIAN My signature below affirms that prior to the time of the procedure; I have explained to the patient and/or his/her legal representative, the risks and benefits involved in the proposed treatment and any reasonable alternative to the proposed treatment. I have also explained the risks and benefits involved in refusal of the proposed treatment and alternatives to  the proposed treatment and have answered the patient's questions. If I have a significant financial interest in a co-management agreement or a significant financial interest in any product or implant, or other significant relationship used in this procedure/surgery, I have disclosed this and had a discussion with my patient.     _____________________________________________________              _____________________________  (Signature of Physician)                                                                                         (Date)                                   (Time)  Patient Name: Bessy ORR Ruiz      : 1940      Printed: 2025     Medical Record #: [id number]                                      Page 1 of 1

## (undated) NOTE — LETTER
[facility]  [address], [place], [place]    Authorization for Surgical Operation and Procedure                               I hereby authorize Jordy Kasper MD, my physician and his/her assistants (if applicable), which may include medical students, residents, and/or fellows, to perform the following surgical operation/ procedure and administer such anesthesia as may be determined necessary by my physician: Operation/Procedure name (s)     Right total mastectomy, right axillary sentinel lymph node biopsy    on Bessy Melchor   2.   I recognize that during the surgical operation/procedure, unforeseen conditions may necessitate additional or different procedures than those listed above.  I, therefore, further authorize and request that the above-named surgeon, assistants, or designees perform such procedures as are, in their judgment, necessary and desirable.    3.   My surgeon/physician has discussed prior to my surgery the potential benefits, risks and side effects of this procedure; the likelihood of achieving goals; and potential problems that might occur during recuperation.  They also discussed reasonable alternatives to the procedure, including risks, benefits, and side effects related to the alternatives and risks related to not receiving this procedure.  I have had all my questions answered and I acknowledge that no guarantee has been made as to the result that may be obtained.    4.   Should the need arise during my operation/procedure, which includes change of level of care prior to discharge, I also consent to the administration of blood and/or blood products.  Further, I understand that despite careful testing and screening of blood or blood products by collecting agencies, I may still be subject to ill effects as a result of receiving a blood transfusion and/or blood products.  The following are some, but not all, of the potential risks that can occur: fever and allergic  reactions, hemolytic reactions, transmission of diseases such as Hepatitis, AIDS and Cytomegalovirus (CMV) and fluid overload.  In the event that I wish to have an autologous transfusion of my own blood, or a directed donor transfusion, I will discuss this with my physician.  Check only if Refusing Blood or Blood Products  I understand refusal of blood or blood products as deemed necessary by my physician may have serious consequences to my condition to include possible death. I hereby assume responsibility for my refusal and release the hospital, its personnel, and my physicians from any responsibility for the consequences of my refusal.    o  Refuse   5.   I authorize the use of any specimen, organs, tissues, body parts or foreign objects that may be removed from my body during the operation/procedure for diagnosis, research or teaching purposes and their subsequent disposal by hospital authorities.  I also authorize the release of specimen test results and/or written reports to my treating physician on the hospital medical staff or other referring or consulting physicians involved in my care, at the discretion of the Pathologist or my treating physician.    6.   I consent to the photographing or videotaping of the operations or procedures to be performed, including appropriate portions of my body for medical, scientific, or educational purposes, provided my identity is not revealed by the pictures or by descriptive texts accompanying them.  If the procedure has been photographed/videotaped, the surgeon will obtain the original picture, image, videotape or CD.  The hospital will not be responsible for storage, release or maintenance of the picture, image, tape or CD.    7.   I consent to the presence of a  or observers in the operating room as deemed necessary by my physician or their designees.    8.   I recognize that in the event my procedure results in extended X-Ray/fluoroscopy time, I may  develop a skin reaction.    9. If I have a Do Not Attempt Resuscitation (DNAR) order in place, that status will be suspended while in the operating room, procedural suite, and during the recovery period unless otherwise explicitly stated by me (or a person authorized to consent on my behalf). The surgeon or my attending physician will determine when the applicable recovery period ends for purposes of reinstating the DNAR order.  10. Patients having a sterilization procedure: I understand that if the procedure is successful the results will be permanent and it will therefore be impossible for me to inseminate, conceive, or bear children.  I also understand that the procedure is intended to result in sterility, although the result has not been guaranteed.   11. I acknowledge that my physician has explained sedation/analgesia administration to me including the risk and benefits I consent to the administration of sedation/analgesia as may be necessary or desirable in the judgment of my physician.    I CERTIFY THAT I HAVE READ AND FULLY UNDERSTAND THE ABOVE CONSENT TO OPERATION and/or OTHER PROCEDURE.     ____________________________________  _________________________________        ______________________________  Signature of Patient    Signature of Responsible Person                Printed Name of Responsible Person                                      ____________________________________  _____________________________                ________________________________  Signature of Witness        Date  Time         Relationship to Patient    STATEMENT OF PHYSICIAN My signature below affirms that prior to the time of the procedure; I have explained to the patient and/or his/her legal representative, the risks and benefits involved in the proposed treatment and any reasonable alternative to the proposed treatment. I have also explained the risks and benefits involved in refusal of the proposed treatment and alternatives to  the proposed treatment and have answered the patient's questions. If I have a significant financial interest in a co-management agreement or a significant financial interest in any product or implant, or other significant relationship used in this procedure/surgery, I have disclosed this and had a discussion with my patient.     _____________________________________________________              _____________________________  (Signature of Physician)                                                                                         (Date)                                   (Time)  Patient Name: Bessy ORR Ruiz      : 1940      Printed: 2025     Medical Record #: [id number]                                      Page 1 of 1

## (undated) NOTE — LETTER
[address], [place], [place]    Authorization for Surgical Operation and Procedure                               I hereby authorize Jordy Kasper MD, my physician and his/her assistants (if applicable), which may include medical students, residents, and/or fellows, to perform the following surgical operation/ procedure and administer such anesthesia as may be determined necessary by my physician: Operation/Procedure name (s) Right breast total Mastectomy with lymphoscintigraphy and right axillary sentinel lymph node biopsy on Bessy Melchor   2.   I recognize that during the surgical operation/procedure, unforeseen conditions may necessitate additional or different procedures than those listed above.  I, therefore, further authorize and request that the above-named surgeon, assistants, or designees perform such procedures as are, in their judgment, necessary and desirable.    3.   My surgeon/physician has discussed prior to my surgery the potential benefits, risks and side effects of this procedure; the likelihood of achieving goals; and potential problems that might occur during recuperation.  They also discussed reasonable alternatives to the procedure, including risks, benefits, and side effects related to the alternatives and risks related to not receiving this procedure.  I have had all my questions answered and I acknowledge that no guarantee has been made as to the result that may be obtained.    4.   Should the need arise during my operation/procedure, which includes change of level of care prior to discharge, I also consent to the administration of blood and/or blood products.  Further, I understand that despite careful testing and screening of blood or blood products by collecting agencies, I may still be subject to ill effects as a result of receiving a blood transfusion and/or blood products.  The following are some, but not all, of the potential risks that can occur:  fever and allergic reactions, hemolytic reactions, transmission of diseases such as Hepatitis, AIDS and Cytomegalovirus (CMV) and fluid overload.  In the event that I wish to have an autologous transfusion of my own blood, or a directed donor transfusion, I will discuss this with my physician.  Check only if Refusing Blood or Blood Products  I understand refusal of blood or blood products as deemed necessary by my physician may have serious consequences to my condition to include possible death. I hereby assume responsibility for my refusal and release the hospital, its personnel, and my physicians from any responsibility for the consequences of my refusal.    o  Refuse   5.   I authorize the use of any specimen, organs, tissues, body parts or foreign objects that may be removed from my body during the operation/procedure for diagnosis, research or teaching purposes and their subsequent disposal by hospital authorities.  I also authorize the release of specimen test results and/or written reports to my treating physician on the hospital medical staff or other referring or consulting physicians involved in my care, at the discretion of the Pathologist or my treating physician.    6.   I consent to the photographing or videotaping of the operations or procedures to be performed, including appropriate portions of my body for medical, scientific, or educational purposes, provided my identity is not revealed by the pictures or by descriptive texts accompanying them.  If the procedure has been photographed/videotaped, the surgeon will obtain the original picture, image, videotape or CD.  The hospital will not be responsible for storage, release or maintenance of the picture, image, tape or CD.    7.   I consent to the presence of a  or observers in the operating room as deemed necessary by my physician or their designees.    8.   I recognize that in the event my procedure results in extended  X-Ray/fluoroscopy time, I may develop a skin reaction.    9. If I have a Do Not Attempt Resuscitation (DNAR) order in place, that status will be suspended while in the operating room, procedural suite, and during the recovery period unless otherwise explicitly stated by me (or a person authorized to consent on my behalf). The surgeon or my attending physician will determine when the applicable recovery period ends for purposes of reinstating the DNAR order.  10. Patients having a sterilization procedure: I understand that if the procedure is successful the results will be permanent and it will therefore be impossible for me to inseminate, conceive, or bear children.  I also understand that the procedure is intended to result in sterility, although the result has not been guaranteed.   11. I acknowledge that my physician has explained sedation/analgesia administration to me including the risk and benefits I consent to the administration of sedation/analgesia as may be necessary or desirable in the judgment of my physician.    I CERTIFY THAT I HAVE READ AND FULLY UNDERSTAND THE ABOVE CONSENT TO OPERATION and/or OTHER PROCEDURE.     ____________________________________  _________________________________        ______________________________  Signature of Patient    Signature of Responsible Person                Printed Name of Responsible Person                                      ____________________________________  _____________________________                ________________________________  Signature of Witness        Date  Time         Relationship to Patient    STATEMENT OF PHYSICIAN My signature below affirms that prior to the time of the procedure; I have explained to the patient and/or his/her legal representative, the risks and benefits involved in the proposed treatment and any reasonable alternative to the proposed treatment. I have also explained the risks and benefits involved in refusal of the proposed  treatment and alternatives to the proposed treatment and have answered the patient's questions. If I have a significant financial interest in a co-management agreement or a significant financial interest in any product or implant, or other significant relationship used in this procedure/surgery, I have disclosed this and had a discussion with my patient.     _____________________________________________________              _____________________________  (Signature of Physician)                                                                                         (Date)                                   (Time)  Patient Name: Bessy ORR Ruiz      : 1940      Printed: 2025     Medical Record #: [id number]                                      Page 1 of 1